# Patient Record
Sex: FEMALE | Race: WHITE | Employment: FULL TIME | URBAN - METROPOLITAN AREA
[De-identification: names, ages, dates, MRNs, and addresses within clinical notes are randomized per-mention and may not be internally consistent; named-entity substitution may affect disease eponyms.]

---

## 2022-03-24 ENCOUNTER — HOSPITAL ENCOUNTER (OUTPATIENT)
Dept: PREADMISSION TESTING | Age: 37
End: 2022-03-24

## 2022-03-24 ENCOUNTER — HOSPITAL ENCOUNTER (OUTPATIENT)
Dept: LAB | Age: 37
Discharge: HOME | End: 2022-03-24

## 2022-03-24 LAB — SENTARA SPECIMEN COL,SENBCF: NORMAL

## 2022-03-24 PROCEDURE — 99001 SPECIMEN HANDLING PT-LAB: CPT

## 2022-04-26 ENCOUNTER — HOSPITAL ENCOUNTER (OUTPATIENT)
Dept: PREADMISSION TESTING | Age: 37
Discharge: HOME | End: 2022-04-26

## 2022-04-26 VITALS — HEIGHT: 67 IN | BODY MASS INDEX: 24.33 KG/M2 | WEIGHT: 155 LBS

## 2022-04-26 RX ORDER — SODIUM CHLORIDE, SODIUM LACTATE, POTASSIUM CHLORIDE, CALCIUM CHLORIDE 600; 310; 30; 20 MG/100ML; MG/100ML; MG/100ML; MG/100ML
125 INJECTION, SOLUTION INTRAVENOUS CONTINUOUS
Status: CANCELLED | OUTPATIENT
Start: 2022-04-26

## 2022-04-26 RX ORDER — ACETAMINOPHEN 325 MG/1
325 TABLET ORAL
COMMUNITY

## 2022-04-26 NOTE — PERIOP NOTES
PAT - SURGICAL PRE-ADMISSION INSTRUCTIONS    NAME:  Doroteo Cruz                                                          TODAY'S DATE:  4/26/2022    SURGERY DATE:  5/4/2022                                  SURGERY ARRIVAL TIME:   TBA    1. Do NOT eat or drink anything, including candy or gum, after MIDNIGHT on 5/4/2022 , unless you have specific instructions from your Surgeon or Anesthesia Provider to do so. 2. No smoking 24 hours before surgery. 3. No alcohol 24 hours prior to the day of surgery. 4. No recreational drugs for one week prior to the day of surgery. 5. Leave all valuables, including money/purse, at home. 6. Remove all jewelry, nail polish, makeup (including mascara); no lotions, powders, deodorant, or perfume/cologne/after shave. 7. Glasses/Contact lenses and Dentures may be worn to the hospital.  They will be removed prior to surgery. 8. Call your doctor if symptoms of a cold or illness develop within 24 ours prior to surgery. 9. AN ADULT MUST DRIVE YOU HOME AFTER OUTPATIENT SURGERY. 10. If you are having an OUTPATIENT procedure, please make arrangements for a responsible adult to be with you for 24 hours after your surgery. 11. If you are admitted to the hospital, you will be assigned to a bed after surgery is complete. Normally a family member will not be able to see you until you are in your assigned bed. 12. Visitation Restrictions Explained. Special Instructions:  Covid Test Not needed. Patient not vaccinated, Quarantine requirements discussed  No Advanced Directive or DNR  NONE.     Patient Prep:    use skin prep cloths with CHG     These surgical instructions were reviewed with patient during the PAT phone call

## 2022-05-03 ENCOUNTER — ANESTHESIA EVENT (OUTPATIENT)
Dept: SURGERY | Age: 37
End: 2022-05-03
Payer: COMMERCIAL

## 2022-05-04 ENCOUNTER — ANESTHESIA (OUTPATIENT)
Dept: SURGERY | Age: 37
End: 2022-05-04
Payer: COMMERCIAL

## 2022-05-04 ENCOUNTER — HOSPITAL ENCOUNTER (OUTPATIENT)
Age: 37
Setting detail: OUTPATIENT SURGERY
Discharge: HOME | End: 2022-05-04
Attending: SURGERY | Admitting: SURGERY
Payer: COMMERCIAL

## 2022-05-04 VITALS
HEART RATE: 52 BPM | SYSTOLIC BLOOD PRESSURE: 100 MMHG | OXYGEN SATURATION: 98 % | TEMPERATURE: 97.2 F | RESPIRATION RATE: 16 BRPM | DIASTOLIC BLOOD PRESSURE: 67 MMHG

## 2022-05-04 DIAGNOSIS — K40.90 NON-RECURRENT UNILATERAL INGUINAL HERNIA WITHOUT OBSTRUCTION OR GANGRENE: Primary | ICD-10-CM

## 2022-05-04 LAB — HCG UR QL: NEGATIVE

## 2022-05-04 PROCEDURE — 77030008683 HC TU ET CUF COVD -A: Performed by: SPECIALIST

## 2022-05-04 PROCEDURE — 2709999900 HC NON-CHARGEABLE SUPPLY: Performed by: SURGERY

## 2022-05-04 PROCEDURE — 77030010507 HC ADH SKN DERMBND J&J -B: Performed by: SURGERY

## 2022-05-04 PROCEDURE — 74011000250 HC RX REV CODE- 250: Performed by: SURGERY

## 2022-05-04 PROCEDURE — 81025 URINE PREGNANCY TEST: CPT

## 2022-05-04 PROCEDURE — 74011000250 HC RX REV CODE- 250: Performed by: SPECIALIST

## 2022-05-04 PROCEDURE — 77030008477 HC STYL SATN SLP COVD -A: Performed by: SPECIALIST

## 2022-05-04 PROCEDURE — 77030035277 HC OBTRTR BLDELSS DISP INTU -B: Performed by: SURGERY

## 2022-05-04 PROCEDURE — 74011250636 HC RX REV CODE- 250/636: Performed by: SPECIALIST

## 2022-05-04 PROCEDURE — 77030022704 HC SUT VLOC COVD -B: Performed by: SURGERY

## 2022-05-04 PROCEDURE — 77030020782 HC GWN BAIR PAWS FLX 3M -B: Performed by: SURGERY

## 2022-05-04 PROCEDURE — 76210000006 HC OR PH I REC 0.5 TO 1 HR: Performed by: SURGERY

## 2022-05-04 PROCEDURE — 74011250636 HC RX REV CODE- 250/636: Performed by: SURGERY

## 2022-05-04 PROCEDURE — C1781 MESH (IMPLANTABLE): HCPCS | Performed by: SURGERY

## 2022-05-04 PROCEDURE — 76010000934 HC OR TIME 1 TO 1.5HR INTENSV - TIER 2: Performed by: SURGERY

## 2022-05-04 PROCEDURE — 77030020703 HC SEAL CANN DISP INTU -B: Performed by: SURGERY

## 2022-05-04 PROCEDURE — 77030002933 HC SUT MCRYL J&J -A: Performed by: SURGERY

## 2022-05-04 PROCEDURE — 77030016151 HC PROTCTR LNS DFOG COVD -B: Performed by: SURGERY

## 2022-05-04 PROCEDURE — 77030031492 HC PRT ACC BLNT AIRSEAL CNMD -B: Performed by: SURGERY

## 2022-05-04 PROCEDURE — 77030003578 HC NDL INSUF VERES AMR -B: Performed by: SURGERY

## 2022-05-04 PROCEDURE — 77030006643: Performed by: SPECIALIST

## 2022-05-04 PROCEDURE — 77030040361 HC SLV COMPR DVT MDII -B: Performed by: SURGERY

## 2022-05-04 PROCEDURE — 76210000021 HC REC RM PH II 0.5 TO 1 HR: Performed by: SURGERY

## 2022-05-04 PROCEDURE — 77030031139 HC SUT VCRL2 J&J -A: Performed by: SURGERY

## 2022-05-04 PROCEDURE — 76060000033 HC ANESTHESIA 1 TO 1.5 HR: Performed by: SURGERY

## 2022-05-04 PROCEDURE — 77030031140 HC SUT VCRL3 J&J -A: Performed by: SURGERY

## 2022-05-04 DEVICE — LAPAROSCOPIC SELF-FIXATING MESH POLYESTER WITH POLYLACTIC ACID GRIPS AND COLLAGEN FILM
Type: IMPLANTABLE DEVICE | Site: GROIN | Status: FUNCTIONAL
Brand: PROGRIP

## 2022-05-04 RX ORDER — FENTANYL CITRATE 50 UG/ML
25 INJECTION, SOLUTION INTRAMUSCULAR; INTRAVENOUS
Status: DISCONTINUED | OUTPATIENT
Start: 2022-05-04 | End: 2022-05-04 | Stop reason: HOSPADM

## 2022-05-04 RX ORDER — OXYCODONE AND ACETAMINOPHEN 5; 325 MG/1; MG/1
1 TABLET ORAL AS NEEDED
Status: DISCONTINUED | OUTPATIENT
Start: 2022-05-04 | End: 2022-05-04 | Stop reason: HOSPADM

## 2022-05-04 RX ORDER — ONDANSETRON 2 MG/ML
4 INJECTION INTRAMUSCULAR; INTRAVENOUS ONCE
Status: DISCONTINUED | OUTPATIENT
Start: 2022-05-04 | End: 2022-05-04 | Stop reason: HOSPADM

## 2022-05-04 RX ORDER — GLYCOPYRROLATE 0.2 MG/ML
INJECTION INTRAMUSCULAR; INTRAVENOUS AS NEEDED
Status: DISCONTINUED | OUTPATIENT
Start: 2022-05-04 | End: 2022-05-04 | Stop reason: HOSPADM

## 2022-05-04 RX ORDER — HYDROMORPHONE HYDROCHLORIDE 1 MG/ML
0.5 INJECTION, SOLUTION INTRAMUSCULAR; INTRAVENOUS; SUBCUTANEOUS
Status: DISCONTINUED | OUTPATIENT
Start: 2022-05-04 | End: 2022-05-04 | Stop reason: HOSPADM

## 2022-05-04 RX ORDER — ROCURONIUM BROMIDE 10 MG/ML
INJECTION, SOLUTION INTRAVENOUS AS NEEDED
Status: DISCONTINUED | OUTPATIENT
Start: 2022-05-04 | End: 2022-05-04 | Stop reason: HOSPADM

## 2022-05-04 RX ORDER — BUPIVACAINE HYDROCHLORIDE 2.5 MG/ML
INJECTION, SOLUTION EPIDURAL; INFILTRATION; INTRACAUDAL AS NEEDED
Status: DISCONTINUED | OUTPATIENT
Start: 2022-05-04 | End: 2022-05-04 | Stop reason: HOSPADM

## 2022-05-04 RX ORDER — SODIUM CHLORIDE, SODIUM LACTATE, POTASSIUM CHLORIDE, CALCIUM CHLORIDE 600; 310; 30; 20 MG/100ML; MG/100ML; MG/100ML; MG/100ML
125 INJECTION, SOLUTION INTRAVENOUS CONTINUOUS
Status: DISCONTINUED | OUTPATIENT
Start: 2022-05-04 | End: 2022-05-04 | Stop reason: HOSPADM

## 2022-05-04 RX ORDER — SODIUM CHLORIDE, SODIUM LACTATE, POTASSIUM CHLORIDE, CALCIUM CHLORIDE 600; 310; 30; 20 MG/100ML; MG/100ML; MG/100ML; MG/100ML
50 INJECTION, SOLUTION INTRAVENOUS CONTINUOUS
Status: DISCONTINUED | OUTPATIENT
Start: 2022-05-04 | End: 2022-05-04 | Stop reason: HOSPADM

## 2022-05-04 RX ORDER — MIDAZOLAM HYDROCHLORIDE 1 MG/ML
INJECTION, SOLUTION INTRAMUSCULAR; INTRAVENOUS AS NEEDED
Status: DISCONTINUED | OUTPATIENT
Start: 2022-05-04 | End: 2022-05-04 | Stop reason: HOSPADM

## 2022-05-04 RX ORDER — DEXAMETHASONE SODIUM PHOSPHATE 4 MG/ML
INJECTION, SOLUTION INTRA-ARTICULAR; INTRALESIONAL; INTRAMUSCULAR; INTRAVENOUS; SOFT TISSUE AS NEEDED
Status: DISCONTINUED | OUTPATIENT
Start: 2022-05-04 | End: 2022-05-04 | Stop reason: HOSPADM

## 2022-05-04 RX ORDER — NALOXONE HYDROCHLORIDE 0.4 MG/ML
0.1 INJECTION, SOLUTION INTRAMUSCULAR; INTRAVENOUS; SUBCUTANEOUS
Status: DISCONTINUED | OUTPATIENT
Start: 2022-05-04 | End: 2022-05-04 | Stop reason: HOSPADM

## 2022-05-04 RX ORDER — ONDANSETRON 2 MG/ML
INJECTION INTRAMUSCULAR; INTRAVENOUS AS NEEDED
Status: DISCONTINUED | OUTPATIENT
Start: 2022-05-04 | End: 2022-05-04 | Stop reason: HOSPADM

## 2022-05-04 RX ORDER — LIDOCAINE HYDROCHLORIDE 20 MG/ML
INJECTION, SOLUTION EPIDURAL; INFILTRATION; INTRACAUDAL; PERINEURAL AS NEEDED
Status: DISCONTINUED | OUTPATIENT
Start: 2022-05-04 | End: 2022-05-04 | Stop reason: HOSPADM

## 2022-05-04 RX ORDER — IBUPROFEN 400 MG/1
TABLET ORAL
COMMUNITY

## 2022-05-04 RX ORDER — KETAMINE HYDROCHLORIDE 10 MG/ML
INJECTION, SOLUTION INTRAMUSCULAR; INTRAVENOUS AS NEEDED
Status: DISCONTINUED | OUTPATIENT
Start: 2022-05-04 | End: 2022-05-04 | Stop reason: HOSPADM

## 2022-05-04 RX ORDER — OXYCODONE AND ACETAMINOPHEN 5; 325 MG/1; MG/1
1 TABLET ORAL
Qty: 10 TABLET | Refills: 0 | Status: SHIPPED | OUTPATIENT
Start: 2022-05-04 | End: 2022-05-09

## 2022-05-04 RX ORDER — PROPOFOL 10 MG/ML
INJECTION, EMULSION INTRAVENOUS AS NEEDED
Status: DISCONTINUED | OUTPATIENT
Start: 2022-05-04 | End: 2022-05-04 | Stop reason: HOSPADM

## 2022-05-04 RX ORDER — FENTANYL CITRATE 50 UG/ML
INJECTION, SOLUTION INTRAMUSCULAR; INTRAVENOUS AS NEEDED
Status: DISCONTINUED | OUTPATIENT
Start: 2022-05-04 | End: 2022-05-04 | Stop reason: HOSPADM

## 2022-05-04 RX ORDER — HYDROMORPHONE HYDROCHLORIDE 2 MG/ML
INJECTION, SOLUTION INTRAMUSCULAR; INTRAVENOUS; SUBCUTANEOUS AS NEEDED
Status: DISCONTINUED | OUTPATIENT
Start: 2022-05-04 | End: 2022-05-04 | Stop reason: HOSPADM

## 2022-05-04 RX ADMIN — HYDROMORPHONE HYDROCHLORIDE 0.25 MG: 2 INJECTION, SOLUTION INTRAMUSCULAR; INTRAVENOUS; SUBCUTANEOUS at 08:10

## 2022-05-04 RX ADMIN — FENTANYL CITRATE 25 MCG: 50 INJECTION, SOLUTION INTRAMUSCULAR; INTRAVENOUS at 08:55

## 2022-05-04 RX ADMIN — SUGAMMADEX 140 MG: 100 INJECTION, SOLUTION INTRAVENOUS at 08:20

## 2022-05-04 RX ADMIN — ONDANSETRON HYDROCHLORIDE 4 MG: 2 INJECTION INTRAMUSCULAR; INTRAVENOUS at 08:02

## 2022-05-04 RX ADMIN — CEFOXITIN SODIUM 2 G: 2 POWDER, FOR SOLUTION INTRAVENOUS at 07:28

## 2022-05-04 RX ADMIN — ROCURONIUM BROMIDE 30 MG: 10 INJECTION, SOLUTION INTRAVENOUS at 07:37

## 2022-05-04 RX ADMIN — FENTANYL CITRATE 100 MCG: 50 INJECTION, SOLUTION INTRAMUSCULAR; INTRAVENOUS at 07:36

## 2022-05-04 RX ADMIN — MIDAZOLAM 2 MG: 1 INJECTION INTRAMUSCULAR; INTRAVENOUS at 07:30

## 2022-05-04 RX ADMIN — GLYCOPYRROLATE 0.2 MG: 0.2 INJECTION INTRAMUSCULAR; INTRAVENOUS at 07:30

## 2022-05-04 RX ADMIN — SODIUM CHLORIDE, SODIUM LACTATE, POTASSIUM CHLORIDE, AND CALCIUM CHLORIDE 50 ML/HR: 600; 310; 30; 20 INJECTION, SOLUTION INTRAVENOUS at 06:12

## 2022-05-04 RX ADMIN — HYDROMORPHONE HYDROCHLORIDE 0.25 MG: 2 INJECTION, SOLUTION INTRAMUSCULAR; INTRAVENOUS; SUBCUTANEOUS at 08:19

## 2022-05-04 RX ADMIN — PROPOFOL 150 MG: 10 INJECTION, EMULSION INTRAVENOUS at 07:37

## 2022-05-04 RX ADMIN — SODIUM CHLORIDE, SODIUM LACTATE, POTASSIUM CHLORIDE, AND CALCIUM CHLORIDE 125 ML/HR: 600; 310; 30; 20 INJECTION, SOLUTION INTRAVENOUS at 06:11

## 2022-05-04 RX ADMIN — HYDROMORPHONE HYDROCHLORIDE 0.25 MG: 2 INJECTION, SOLUTION INTRAMUSCULAR; INTRAVENOUS; SUBCUTANEOUS at 07:54

## 2022-05-04 RX ADMIN — KETAMINE HYDROCHLORIDE 20 MG: 10 INJECTION, SOLUTION INTRAMUSCULAR; INTRAVENOUS at 07:36

## 2022-05-04 RX ADMIN — HYDROMORPHONE HYDROCHLORIDE 0.25 MG: 2 INJECTION, SOLUTION INTRAMUSCULAR; INTRAVENOUS; SUBCUTANEOUS at 08:06

## 2022-05-04 RX ADMIN — LIDOCAINE HYDROCHLORIDE 60 MG: 20 INJECTION, SOLUTION INTRAVENOUS at 07:36

## 2022-05-04 RX ADMIN — KETAMINE HYDROCHLORIDE 10 MG: 10 INJECTION, SOLUTION INTRAMUSCULAR; INTRAVENOUS at 07:43

## 2022-05-04 RX ADMIN — DEXAMETHASONE SODIUM PHOSPHATE 8 MG: 4 INJECTION, SOLUTION INTRAMUSCULAR; INTRAVENOUS at 07:44

## 2022-05-04 NOTE — H&P
History & Physical    Patient: Clem Frey MRN: [id number]  CSN: [id number]    YOB: 1985  Age: 39 y.o. Sex: female      DOA: 5/4/2022       HPI:     Clem Frey is a 39 y.o. female who presents for [facility]. Past Medical History:   Diagnosis Date    GERD (gastroesophageal reflux disease)        Past Surgical History:   Procedure Laterality Date    HX DILATION AND CURETTAGE  2008    HX HEENT      wisdom teeth    HX TUBAL LIGATION  2011       History reviewed. No pertinent family history. Social History     Socioeconomic History    Marital status:    Tobacco Use    Smoking status: Never Smoker    Smokeless tobacco: Never Used   Vaping Use    Vaping Use: Never used   Substance and Sexual Activity    Alcohol use: Not Currently     Comment: rare    Drug use: Never       Prior to Admission medications    Medication Sig Start Date End Date Taking? Authorizing Provider   ibuprofen (MOTRIN) 400 mg tablet Take  by mouth every six (6) hours as needed for Pain. Yes Provider, Historical   acetaminophen (TylenoL) 325 mg tablet Take 325 mg by mouth every four (4) hours as needed for Pain. Provider, Historical   multivit-minerals/folic acid (ADULT MULTIVITAMIN GUMMIES PO) Take 1 Tablet by mouth. Provider, Historical       No Known Allergies    Review of Systems  A comprehensive review of systems was negative except for that written in the History of Present Illness. Physical Exam:      Visit Vitals  /69 (BP 1 Location: Left upper arm, BP Patient Position: At rest;Sitting)   Pulse 77   Temp 98.2 °F (36.8 °C)   Resp 16   SpO2 99%       Physical Exam:  Physical Exam:   General:  Alert, cooperative, no distress, appears stated age. Eyes:  Conjunctivae/corneas clear. PERRL, EOMs intact. Fundi benign   Ears:  Normal TMs and external ear canals both ears. Nose: Nares normal. Septum midline. Mucosa normal. No drainage or sinus tenderness.    Mouth/Throat: Lips, mucosa, and tongue normal. Teeth and gums normal.   Neck: Supple, symmetrical, trachea midline, no adenopathy, thyroid: no enlargement/tenderness/nodules, no carotid bruit and no JVD. Back:   Symmetric, no curvature. ROM normal. No CVA tenderness. Lungs:   Clear to auscultation bilaterally. Heart:  Regular rate and rhythm, S1, S2 normal, no murmur, click, rub or gallop. Abdomen:   Soft, non-tender. Bowel sounds normal. No masses,  No organomegaly. LIH, small umbilical hernia. Extremities: Extremities normal, atraumatic, no cyanosis or edema. Pulses: 2+ and symmetric all extremities. Skin: Skin color, texture, turgor normal. No rashes or lesions   Lymph nodes: Cervical, supraclavicular, and axillary nodes normal.   Neurologic: CNII-XII intact. Normal strength, sensation and reflexes throughout. Labs Reviewed: All lab results for the last 24 hours reviewed. Assessment/Plan     Active Problems:    * No active hospital problems. *      [facility] repair, discussed risks. May need to repair umbilical hernia if becomes a problem during procedure, discussed with patient.

## 2022-05-04 NOTE — ANESTHESIA PREPROCEDURE EVALUATION
Relevant Problems   No relevant active problems       Anesthetic History   No history of anesthetic complications            Review of Systems / Medical History  Patient summary reviewed, nursing notes reviewed and pertinent labs reviewed    Pulmonary  Within defined limits                 Neuro/Psych   Within defined limits           Cardiovascular  Within defined limits                Exercise tolerance: >4 METS     GI/Hepatic/Renal  Within defined limits           Pertinent negatives: No GERD   Endo/Other  Within defined limits           Other Findings              Physical Exam    Airway  Mallampati: II  TM Distance: 4 - 6 cm  Neck ROM: normal range of motion   Mouth opening: Normal     Cardiovascular               Dental    Dentition: Caps/crowns     Pulmonary                 Abdominal         Other Findings            Anesthetic Plan    ASA: 1  Anesthesia type: general          Induction: Intravenous  Anesthetic plan and risks discussed with: Patient      Not on BCP - sugammadex ok

## 2022-05-04 NOTE — BRIEF OP NOTE
Brief Postoperative Note    Patient: Carine Hull  YOB: 1985  MRN: [id number]    Date of Procedure: 5/4/2022     Pre-Op Diagnosis: RIGHT INGUINAL HERNIA, UMBILICAL HERNIA    Post-Op Diagnosis: Same as preoperative diagnosis. Procedure(s):  ROBOTIC REPAIR LEFT INGUINAL HERNIA WITH MESH    Surgeon(s): Shu Carlton MD    Surgical Assistant: Surg Asst-1: Farheen Sanchez    Anesthesia: General     Estimated Blood Loss (mL): Minimal    Complications: None    Specimens: * No specimens in log *     Implants:   Implant Name Type Inv.  Item Serial No.  Lot No. LRB No. Used Action   MESH ABSORB SURG PROGRIP 10X15 -- PROGRIP - [id number]  MESH ABSORB SURG PROGRIP 10X15 -- PROGRIP  [employer]  SURGICAL [id number] Left 1 Implanted       Drains: * No LDAs found *    Findings: [facility]    Electronically Signed by Andre Hicks MD on 5/4/2022 at 8:23 AM

## 2022-05-04 NOTE — ANESTHESIA POSTPROCEDURE EVALUATION
Post-Anesthesia Evaluation and Assessment    Cardiovascular Function/Vital Signs  Visit Vitals  /75   Pulse 68   Temp 36.1 °C (97 °F)   Resp 17   SpO2 100%       Patient is status post Procedure(s):  ROBOTIC REPAIR LEFT INGUINAL HERNIA WITH MESH. Nausea/Vomiting: Controlled. Postoperative hydration reviewed and adequate. Pain:  Pain Scale 1: Numeric (0 - 10) (05/04/22 0905)  Pain Intensity 1: 2 (05/04/22 0905)   Managed. Neurological Status:   Neuro (WDL): Exceptions to WDL (05/04/22 0857)   At baseline. Mental Status and Level of Consciousness: Baseline and appropriate for discharge. Pulmonary Status:   O2 Device: Nasal cannula (05/04/22 0857)   Adequate oxygenation and airway patent. Complications related to anesthesia: None    Post-anesthesia assessment completed. No concerns. Patient has met all discharge requirements.     Signed By: Andrade Traore MD    May 4, 2022

## 2022-05-04 NOTE — PERIOP NOTES
Reviewed PTA medication list with patient/caregiver and patient/caregiver denies any additional medications. Patient admits to having a responsible adult care for them at home for at least 24 hours after surgery. Patient encouraged to use gown warming system and informed that using said warming gown to regulate body temperature prior to a procedure has been shown to help reduce the risks of blood clots and infection. Patient's pharmacy of choice verified and documented in PTA medication section. Dual skin assessment & fall risk band verification completed with Gay Mejía RN.

## 2022-05-04 NOTE — PERIOP NOTES
Called holding and notified Palmira Ruggiero RN that patient had ibuprofen 2 days ago and patient is stating inguinal hernia on left side; however, surgery posted for right. Palmira Ruggiero RN states she will clarify consent with Dr. Lucia Boston prior to procedure and notify him about ibuprofen.

## 2022-05-04 NOTE — DISCHARGE INSTRUCTIONS
DISCHARGE SUMMARY from Nurse    PATIENT INSTRUCTIONS:    After general anesthesia or intravenous sedation, for 24 hours or while taking prescription Narcotics:  · Limit your activities  · Do not drive and operate hazardous machinery  · Do not make important personal or business decisions  · Do  not drink alcoholic beverages  · If you have not urinated within 8 hours after discharge, please contact your surgeon on call. Report the following to your surgeon:  · Excessive pain, swelling, redness or odor of or around the surgical area  · Temperature over 100.5  · Nausea and vomiting lasting longer than 4 hours or if unable to take medications  · Any signs of decreased circulation or nerve impairment to extremity: change in color, persistent  numbness, tingling, coldness or increase pain  · Any questions    What to do at Home:  [address] 2 WEEKS CALL FOR APPT    If you experience any of the following symptoms heavy bleeding, fevers,severe pain, please follow up with dr Katie Woods    *  Please give a list of your current medications to your Primary Care Provider. *  Please update this list whenever your medications are discontinued, doses are      changed, or new medications (including over-the-counter products) are added. *  Please carry medication information at all times in case of emergency situations. These are general instructions for a healthy lifestyle:    No smoking/ No tobacco products/ Avoid exposure to second hand smoke  Surgeon General's Warning:  Quitting smoking now greatly reduces serious risk to your health.     Obesity, smoking, and sedentary lifestyle greatly increases your risk for illness    A healthy diet, regular physical exercise & weight monitoring are important for maintaining a healthy lifestyle    You may be retaining fluid if you have a history of heart failure or if you experience any of the following symptoms:  Weight gain of 3 pounds or more overnight or 5 pounds in a week, increased swelling in our hands or feet or shortness of breath while lying flat in bed. Please call your doctor as soon as you notice any of these symptoms; do not wait until your next office visit. The discharge information has been reviewed with the patient and caregiver. The patient and caregiver verbalized understanding. Discharge medications reviewed with the patient and caregiver and appropriate educational materials and side effects teaching were provided. ___________________________________________________________________________________________________________________________________    Post-Operative Discharge Instructions  Nesha Frankel. Batsheva Carr M.D.  [address] [place] [address]  [phone]    Patient: Michal Ganser MRN: [id number]  [facility]: [id number]    YOB: 1985  Age: 39 y.o. Sex: female    DOA: 5/4/2022 LOS:  LOS: 0 days   Discharge Date:      Acute Diagnoses:  RIGHT INGUINAL HERNIA, UMBILICAL HERNIA    Chronic Medical Diagnoses:  Problem List as of 5/4/2022 Date Reviewed: 5/4/2022    None          Diet  1. Resume prior to surgery diet as tolerated. Activity  1. Do not drive a car or operate any hazardous machinery the day of surgery. 2. Rest quietly today. 3. No bending or heavy lifting. 4. You may resume other prior to surgery activities as tolerated. 5. You may remove the bandage and shower in 1 day. Drain / Wound Care  1. Follow all drain / wound care instructions exactly as explained by the Nurse at time of discharge. 2. Apply an ice pack to the surgical site for 48 hours. 3. Do not put any salves or ointments on the wound. Allow it to form a dry scab. 4. Leave steri-strips / Dermabond alone. They should be allowed to fall off on their own in 7-14 days. Medications  1. It is important to take your medications exactly as they are prescribed.   2. Keep your medication in the bottles provided by the pharmacist, and keep a list of the medication names, dosages, and times they should be taken in your wallet. Call 911 anytime you think you may need emergency care. For example, call if:  · You passed out (lost consciousness). · You have severe trouble breathing. · You have sudden chest pain and shortness of breath. Notify your Surgeon for any of the followin. Fever, chills, nausea, vomiting, severe abdominal pain or bleeding. 2. If you experience any redness or discharge or sign of infection. 3. Persistent nausea lasting more than 24 hours. If you are unable to reach your Surgeon for any of the symptoms above, you should proceed directly to the nearest Emergency Department. Post-Operative Appointment Information    Call Dr. Trudy Vazquez office tomorrow morning at [phone] to schedule a post-operative office visit in one (1) week. If any questions or concerns arise, call your Surgeon at [phone].     Patient armband removed and shredded

## 2022-05-10 NOTE — OP NOTES
[facility]    Name:  Eduardo Quintana  MR#:   [id number]  :  1985  ACCOUNT #:  [de-identified]  DATE OF SERVICE:  2022    PREOPERATIVE DIAGNOSIS:  Left inguinal hernia. POSTOPERATIVE DIAGNOSIS:  Left inguinal hernia. PROCEDURE PERFORMED:  Robotic laparoscopic repair of left inguinal hernia. SURGEON:  Juan M Nowak MD    ASSISTANT:  None. ANESTHESIA:  General endotracheal.    COMPLICATIONS:  None. SPECIMENS REMOVED:  None. IMPLANTS:  None. ESTIMATED BLOOD LOSS:  Minimal.    INDICATIONS:  The patient presents with left inguinal hernia. She will undergo repair. I have discussed risks, benefits, and alternatives to her. She understands and wishes to proceed. PROCEDURE:  She was placed in the supine position. Her abdomen was prepped and draped in the usual fashion. A Veress needle was inserted in the left upper quadrant using one-drop technique and pneumoperitoneum was established. An 8-mm robotic port was placed in the supraumbilical position under visualization. An 8-mm AirSeal port was placed in the epigastrium as a working port. Two 8-mm robotic ports were placed, one on the right side of the abdominal wall, one on the left side of the abdominal wall under visualization. The robot was docked in the routine fashion. The abdomen was inspected. The patient was found to have a moderate-sized left inguinal hernia. This was an indirect defect. The peritoneal flap on the left side was taken down using a monopolar scissors. Once the adequate flap was established, all critical views and spaces were identified and confirmed. The hernia defect was reduced and taken down and dissected away from the round ligament and inguinal floor. Once an adequate flap was established, the round ligament was left intact. The nerves appeared to be covered. A laparoscopic ProGrip mesh was placed over the inguinal floor and allowed to self .   There was good overlay and overlap of the mesh. The peritoneal flap was closed with the 2-0 PDS V-Loc continuous suture. The patient had a small umbilical hernia, which was not involved and therefore was brought to the room. All ports were removed. The skin incisions were closed with 4-0 Monocryl subcuticular closure and Dermabond. The patient tolerated the procedure well.       MD MIKI [id number]  D:  05/10/2022 8:29  T:  05/10/2022 13:51  JOB #:  [id number]

## (undated) DEVICE — 3-0 COATED VICRYL PLUS UNDYED 1X27" SH --

## (undated) DEVICE — DERMABOND SKIN ADH 0.7ML --

## (undated) DEVICE — TRI-LUMEN FILTERED TUBE SET WITH ACTIVATED CHARCOAL FILTER: Brand: AIRSEAL

## (undated) DEVICE — GARMENT,MEDLINE,DVT,INT,CALF,MED, GEN2: Brand: MEDLINE

## (undated) DEVICE — LAP CHOLE: Brand: MEDLINE INDUSTRIES, INC.

## (undated) DEVICE — PAD PT POS 36 IN SURGYPAD DISP

## (undated) DEVICE — GLOVE ORANGE PI 7 1/2   MSG9075

## (undated) DEVICE — BLADELESS OBTURATOR: Brand: WECK VISTA

## (undated) DEVICE — SUTURE DEV SZ 2-0 WND CLSR ABSRB GS-22 VLOC COVIDIEN VLOCM2145

## (undated) DEVICE — SEAL UNIV 5-8MM DISP BX/10 -- DA VINCI XI - SNGL USE

## (undated) DEVICE — SUTURE MCRYL SZ 4-0 L18IN ABSRB UD L19MM PS-2 3/8 CIR PRIM Y496G

## (undated) DEVICE — AIRSEAL 8 MM ACCESS PORT AND LOW PROFILE OBTURATOR WITH BLADELESS OPTICAL TIP, 120 MM LENGTH: Brand: AIRSEAL

## (undated) DEVICE — VISUALIZATION SYSTEM: Brand: CLEARIFY

## (undated) DEVICE — SOL IRRIGATION INJ NACL 0.9% 500ML BTL

## (undated) DEVICE — SUT MONOCRYL PLUS UD 4-0 --

## (undated) DEVICE — GLOVE SURG SZ 8 L12IN FNGR THK79MIL GRN LTX FREE

## (undated) DEVICE — SUT VCRL + 2-0 27IN CT2 UD -- 36/BX

## (undated) DEVICE — MINOR: Brand: MEDLINE INDUSTRIES, INC.

## (undated) DEVICE — COVER MPLR TIP CRV SCIS ACC DA VINCI

## (undated) DEVICE — DRAPE XR C ARM 41X74IN LF --

## (undated) DEVICE — ARM DRAPE

## (undated) DEVICE — COLUMN DRAPE

## (undated) DEVICE — INSUFFLATION NEEDLE TO ESTABLISH PNEUMOPERITONEUM.: Brand: INSUFFLATION NEEDLE